# Patient Record
Sex: MALE | Race: WHITE | NOT HISPANIC OR LATINO | Employment: OTHER | URBAN - METROPOLITAN AREA
[De-identification: names, ages, dates, MRNs, and addresses within clinical notes are randomized per-mention and may not be internally consistent; named-entity substitution may affect disease eponyms.]

---

## 2023-04-03 ENCOUNTER — OFFICE VISIT (OUTPATIENT)
Dept: ORTHOPEDIC SURGERY | Facility: CLINIC | Age: 63
End: 2023-04-03
Payer: COMMERCIAL

## 2023-04-03 VITALS — HEART RATE: 86 BPM | BODY MASS INDEX: 37.24 KG/M2 | HEIGHT: 71 IN | OXYGEN SATURATION: 94 % | WEIGHT: 266 LBS

## 2023-04-03 DIAGNOSIS — S76.312A PARTIAL HAMSTRING TEAR, LEFT, INITIAL ENCOUNTER: Primary | ICD-10-CM

## 2023-04-03 DIAGNOSIS — S86.812A STRAIN OF CALF MUSCLE, LEFT, INITIAL ENCOUNTER: ICD-10-CM

## 2023-04-03 PROCEDURE — 99203 OFFICE O/P NEW LOW 30 MIN: CPT | Performed by: STUDENT IN AN ORGANIZED HEALTH CARE EDUCATION/TRAINING PROGRAM
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
## 2023-04-17 ENCOUNTER — OFFICE VISIT (OUTPATIENT)
Dept: ORTHOPEDIC SURGERY | Facility: CLINIC | Age: 63
End: 2023-04-17
Payer: COMMERCIAL

## 2023-04-17 VITALS — OXYGEN SATURATION: 93 % | WEIGHT: 266 LBS | HEART RATE: 82 BPM | BODY MASS INDEX: 37.24 KG/M2 | HEIGHT: 71 IN

## 2023-04-17 DIAGNOSIS — S76.312A PARTIAL HAMSTRING TEAR, LEFT, INITIAL ENCOUNTER: Primary | ICD-10-CM

## 2023-04-17 DIAGNOSIS — S86.812A STRAIN OF CALF MUSCLE, LEFT, INITIAL ENCOUNTER: ICD-10-CM

## 2023-04-17 NOTE — PROGRESS NOTES
"Chief Complaint  Pain and Follow-up of the Left Leg    Subjective          Bandar Stanford presents to [facility] for   History of Present Illness    The patient presents here today for follow up evaluation of the left leg. The patient has been treating his left partial hamstring tear conservatively. He reports he is overall doing well. He still has tenderness and pain. He has been using an ice machine with some relief.     Allergies   Allergen Reactions   • Sulfa Antibiotics Other (See Comments)     Pt reports that he gets fever whenever he takes Sulfa        Social History     Socioeconomic History   • Marital status:    Tobacco Use   • Smoking status: Every Day     Packs/day: 1.00     Years: 15.00     Pack years: 15.00     Types: Cigarettes   Vaping Use   • Vaping Use: Never used   Substance and Sexual Activity   • Alcohol use: Yes     Alcohol/week: 26.0 standard drinks     Types: 21 Shots of liquor, 5 Drinks containing 0.5 oz of alcohol per week   • Drug use: Not Currently   • Sexual activity: Yes     Partners: Female     Birth control/protection: Same-sex partner        I reviewed the patient's chief complaint, history of present illness, review of systems, past medical history, surgical history, family history, social history, medications, and allergy list.     REVIEW OF SYSTEMS    Constitutional: Denies fevers, chills, weight loss  Cardiovascular: Denies chest pain, shortness of breath  Skin: Denies rashes, acute skin changes  Neurologic: Denies headache, loss of consciousness  MSK: left lower extremity pain      Objective   Vital Signs:   Pulse 82   Ht 180.3 cm (71\")   Wt 121 kg (266 lb)   SpO2 93%   BMI 37.10 kg/m²     Body mass index is 37.1 kg/m².    Physical Exam    General: Alert. No acute distress.   Left lower extremity- Swelling and bruising improving but settling to the lower leg. Tender to the calf along the medial head of the gastroph. Negative bunn. Knee " Stable to varus/valgus stress. Negative Natty's. Tender to the posterior hamstring.     Procedures    Imaging Results (Most Recent)     None                   Assessment and Plan        No results found.     Diagnoses and all orders for this visit:    1. Partial hamstring tear, left, initial encounter (Primary)    2. Strain of calf muscle, left, initial encounter        Discussed the treatment plan with the patient.  Plan to continue conservative treatment. We discussed using compression socks for the swelling. Plan to continue home exercises. Plan to continue NSAIDS for swelling.     Educated on risk of smoking. Discussed options for smoking cessation. and Call or return if worsening symptoms.    Scribed for Rigo Davis MD by Rosie Guzmán  04/17/2023   10:12 EDT         Follow Up       4 weeks    Patient was given instructions and counseling regarding his condition or for health maintenance advice. Please see specific information pulled into the AVS if appropriate.       I have personally performed the services described in this document as scribed by the above individual and it is both accurate and complete.     Rigo Davis MD  04/17/23  10:16 EDT

## 2023-06-07 ENCOUNTER — OFFICE VISIT (OUTPATIENT)
Dept: ORTHOPEDIC SURGERY | Facility: CLINIC | Age: 63
End: 2023-06-07
Payer: COMMERCIAL

## 2023-06-07 VITALS
SYSTOLIC BLOOD PRESSURE: 157 MMHG | WEIGHT: 250 LBS | BODY MASS INDEX: 35 KG/M2 | DIASTOLIC BLOOD PRESSURE: 100 MMHG | OXYGEN SATURATION: 96 % | HEIGHT: 71 IN | HEART RATE: 72 BPM

## 2023-06-07 DIAGNOSIS — S76.312D PARTIAL HAMSTRING TEAR, LEFT, SUBSEQUENT ENCOUNTER: Primary | ICD-10-CM

## 2023-06-07 DIAGNOSIS — S86.812D STRAIN OF CALF MUSCLE, LEFT, SUBSEQUENT ENCOUNTER: ICD-10-CM

## 2023-06-07 RX ORDER — TADALAFIL 20 MG/1
TABLET ORAL EVERY 24 HOURS
COMMUNITY

## 2023-06-07 NOTE — PROGRESS NOTES
"Chief Complaint  Pain and Initial Evaluation of the Left Thigh    Subjective          Bandar Stanford presents to [facility] for   History of Present Illness    The patient presents here today for evaluation of the left thigh. The patient has been treating his left partial hamstring tear conservatively. He has been doing home exercises and medication as needed. He reports it is improving.     Allergies   Allergen Reactions    Sulfa Antibiotics Other (See Comments)     Pt reports that he gets fever whenever he takes Sulfa        Social History     Socioeconomic History    Marital status:    Tobacco Use    Smoking status: Every Day     Packs/day: 1.00     Years: 15.00     Pack years: 15.00     Types: Cigarettes    Smokeless tobacco: Never   Vaping Use    Vaping Use: Never used   Substance and Sexual Activity    Alcohol use: Yes     Alcohol/week: 26.0 standard drinks     Types: 21 Shots of liquor, 5 Drinks containing 0.5 oz of alcohol per week    Drug use: Not Currently    Sexual activity: Yes     Partners: Female     Birth control/protection: Same-sex partner        I reviewed the patient's chief complaint, history of present illness, review of systems, past medical history, surgical history, family history, social history, medications, and allergy list.     REVIEW OF SYSTEMS    Constitutional: Denies fevers, chills, weight loss  Cardiovascular: Denies chest pain, shortness of breath  Skin: Denies rashes, acute skin changes  Neurologic: Denies headache, loss of consciousness  MSK: Left lower extremity pain      Objective   Vital Signs:   /100   Pulse 72   Ht 180.3 cm (71\")   Wt 113 kg (250 lb)   SpO2 96%   BMI 34.87 kg/m²     Body mass index is 34.87 kg/m².    Physical Exam    General: Alert. No acute distress.   Left lower extremity- Tender to the mid hamstring with no gaps. Full knee extension with 120 flexion. Neurovascularly intact. 5/5 knee flexion and extension. " Stable to varus/valgus stress. Stable to anterior/posterior drawer. Knee Extensor Mechanism  intact. Positive EHL, FHL, GS and TA. Sensation intact to all 5 nerves of the foot. Positive pulses.     Procedures    Imaging Results (Most Recent)       None                     Assessment and Plan        No results found.     Diagnoses and all orders for this visit:    1. Partial hamstring tear, left, subsequent encounter (Primary)    2. Strain of calf muscle, left, subsequent encounter        Discussed the treatment plan with the patient.  Plan to continue conservative treatment at this time. Plan to continue home exercises, medication and topicals as needed. Plan to gradually increase activity as tolerated.         Call or return if worsening symptoms.    Scribed for Rigo Davis MD by Rosie Guzmán  06/07/2023   10:45 EDT         Follow Up       PRN    Patient was given instructions and counseling regarding his condition or for health maintenance advice. Please see specific information pulled into the AVS if appropriate.       I have personally performed the services described in this document as scribed by the above individual and it is both accurate and complete.     Rigo Davis MD  06/07/23  10:47 EDT

## 2023-10-04 ENCOUNTER — OFFICE VISIT (OUTPATIENT)
Dept: ORTHOPEDIC SURGERY | Facility: CLINIC | Age: 63
End: 2023-10-04
Payer: COMMERCIAL

## 2023-10-04 VITALS
HEIGHT: 71 IN | HEART RATE: 77 BPM | BODY MASS INDEX: 35 KG/M2 | OXYGEN SATURATION: 94 % | SYSTOLIC BLOOD PRESSURE: 160 MMHG | DIASTOLIC BLOOD PRESSURE: 107 MMHG | WEIGHT: 250 LBS

## 2023-10-04 DIAGNOSIS — M16.12 PRIMARY OSTEOARTHRITIS OF LEFT HIP: ICD-10-CM

## 2023-10-04 DIAGNOSIS — M25.551 RIGHT HIP PAIN: Primary | ICD-10-CM

## 2023-10-04 DIAGNOSIS — S76.312A PARTIAL HAMSTRING TEAR, LEFT, INITIAL ENCOUNTER: ICD-10-CM

## 2023-10-04 RX ORDER — DICLOFENAC SODIUM 75 MG/1
75 TABLET, DELAYED RELEASE ORAL 2 TIMES DAILY
Qty: 30 TABLET | Refills: 0 | Status: SHIPPED | OUTPATIENT
Start: 2023-10-04

## 2023-10-04 RX ORDER — METHYLPREDNISOLONE 4 MG/1
1 TABLET ORAL DAILY
Qty: 21 TABLET | Refills: 0 | Status: SHIPPED | OUTPATIENT
Start: 2023-10-04
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
## 2023-11-15 ENCOUNTER — OFFICE VISIT (OUTPATIENT)
Dept: ORTHOPEDIC SURGERY | Facility: CLINIC | Age: 63
End: 2023-11-15
Payer: COMMERCIAL

## 2023-11-15 VITALS
HEIGHT: 71 IN | OXYGEN SATURATION: 95 % | WEIGHT: 250 LBS | SYSTOLIC BLOOD PRESSURE: 154 MMHG | BODY MASS INDEX: 35 KG/M2 | HEART RATE: 83 BPM | DIASTOLIC BLOOD PRESSURE: 108 MMHG

## 2023-11-15 DIAGNOSIS — M16.12 PRIMARY OSTEOARTHRITIS OF LEFT HIP: Primary | ICD-10-CM

## 2023-11-15 DIAGNOSIS — S76.312D PARTIAL HAMSTRING TEAR, LEFT, SUBSEQUENT ENCOUNTER: ICD-10-CM

## 2023-11-15 NOTE — PROGRESS NOTES
"Chief Complaint  Follow-up and Pain of the Right Hip    Subjective          Bandar Stanford presents to [facility] for   History of Present Illness    The patient presents here today for follow up evaluation of the right hip. The patient has been treating his left partial hamstring tear and hip osteoarthritis conservatively. He still locates pain to the groin radiating to the thigh and knee. He has been taking diclofenac. He tried massage therapy with some relief.     Allergies   Allergen Reactions    Sulfa Antibiotics Other (See Comments)     Pt reports that he gets fever whenever he takes Sulfa        Social History     Socioeconomic History    Marital status:    Tobacco Use    Smoking status: Every Day     Packs/day: 1.00     Years: 15.00     Additional pack years: 0.00     Total pack years: 15.00     Types: Cigarettes    Smokeless tobacco: Never   Vaping Use    Vaping Use: Never used   Substance and Sexual Activity    Alcohol use: Yes     Alcohol/week: 26.0 standard drinks of alcohol     Types: 21 Shots of liquor, 5 Drinks containing 0.5 oz of alcohol per week    Drug use: Not Currently    Sexual activity: Yes     Partners: Female     Birth control/protection: Same-sex partner        I reviewed the patient's chief complaint, history of present illness, review of systems, past medical history, surgical history, family history, social history, medications, and allergy list.     REVIEW OF SYSTEMS    Constitutional: Denies fevers, chills, weight loss  Cardiovascular: Denies chest pain, shortness of breath  Skin: Denies rashes, acute skin changes  Neurologic: Denies headache, loss of consciousness  MSK: Right hip pain      Objective   Vital Signs:   BP (!) 154/108 Comment: PATIENT ADVISED TO SEE PCP REGARDING HIGH BP  Pulse 83   Ht 180.3 cm (71\")   Wt 113 kg (250 lb)   SpO2 95%   BMI 34.87 kg/m²     Body mass index is 34.87 kg/m².    Physical Exam    General: Alert. No acute " distress.   Right lower extremity- Positive EHL, FHL, GS and TA. Sensation intact to all 5 nerves of the foot. Positive pulses. Flexion 90 with groin pain. Internal rotation neutral with pain. External Rotation 20 with pain. Full knee extension. Flexion 120 degrees. Stable to varus/valgus stress. Stable to anterior/posterior drawer. Negative Lachman's. Calf soft. Intact hip flexion and hip Abduction. Negative straight leg raise      Procedures    Imaging Results (Most Recent)       None                     Assessment and Plan        No results found.     Diagnoses and all orders for this visit:    1. Primary osteoarthritis of left hip (Primary)    2. Partial hamstring tear, left, subsequent encounter        Discussed the treatment plan with the patient.  Plan to continue conservative treatment at this time. Plan to continue diclofenac, home exercises and activity modification. May consider injection if symptoms persist.       Educated on risk of smoking/nicotine. Discussed options for smoking cessation. and Call or return if worsening symptoms.    Scribed for Rigo Davis MD by Rosie Guzmán  11/15/2023   10:53 EST         Follow Up       PRN    Patient was given instructions and counseling regarding his condition or for health maintenance advice. Please see specific information pulled into the AVS if appropriate.       I have personally performed the services described in this document as scribed by the above individual and it is both accurate and complete.     Rigo Davis MD  11/15/23  10:56 EST

## 2024-04-01 ENCOUNTER — OFFICE VISIT (OUTPATIENT)
Dept: ORTHOPEDIC SURGERY | Facility: CLINIC | Age: 64
End: 2024-04-01
Payer: COMMERCIAL

## 2024-04-01 VITALS
HEART RATE: 73 BPM | SYSTOLIC BLOOD PRESSURE: 163 MMHG | DIASTOLIC BLOOD PRESSURE: 116 MMHG | HEIGHT: 71 IN | BODY MASS INDEX: 35 KG/M2 | WEIGHT: 250 LBS | OXYGEN SATURATION: 95 %

## 2024-04-01 DIAGNOSIS — M25.551 RIGHT HIP PAIN: Primary | ICD-10-CM

## 2024-04-01 DIAGNOSIS — M16.12 PRIMARY OSTEOARTHRITIS OF LEFT HIP: ICD-10-CM

## 2024-04-01 DIAGNOSIS — S76.312D PARTIAL HAMSTRING TEAR, LEFT, SUBSEQUENT ENCOUNTER: ICD-10-CM

## 2024-04-01 PROCEDURE — 99213 OFFICE O/P EST LOW 20 MIN: CPT | Performed by: STUDENT IN AN ORGANIZED HEALTH CARE EDUCATION/TRAINING PROGRAM

## 2024-04-01 RX ORDER — ASPIRIN 81 MG/1
81 TABLET ORAL DAILY
COMMUNITY

## 2024-04-01 NOTE — PROGRESS NOTES
"Chief Complaint  Follow-up and Pain of the Right Hip    Subjective          Bandar Stanford presents to [facility] for   History of Present Illness    The patient presents here today for follow up evaluation of the right hip. The patient has been treating his left partial hamstring tear and hip osteoarthritis conservatively. He reports he still has pain with ROM of the hip. He has been taking diclofenac. He has no other complaints.     Allergies   Allergen Reactions    Sulfa Antibiotics Other (See Comments)     Pt reports that he gets fever whenever he takes Sulfa        Social History     Socioeconomic History    Marital status:    Tobacco Use    Smoking status: Every Day     Current packs/day: 1.00     Average packs/day: 1 pack/day for 15.0 years (15.0 ttl pk-yrs)     Types: Cigarettes    Smokeless tobacco: Never   Vaping Use    Vaping status: Never Used   Substance and Sexual Activity    Alcohol use: Yes     Alcohol/week: 26.0 standard drinks of alcohol     Types: 21 Shots of liquor, 5 Drinks containing 0.5 oz of alcohol per week    Drug use: Not Currently    Sexual activity: Yes     Partners: Female     Birth control/protection: Same-sex partner        I reviewed the patient's chief complaint, history of present illness, review of systems, past medical history, surgical history, family history, social history, medications, and allergy list.     REVIEW OF SYSTEMS    Constitutional: Denies fevers, chills, weight loss  Cardiovascular: Denies chest pain, shortness of breath  Skin: Denies rashes, acute skin changes  Neurologic: Denies headache, loss of consciousness  MSK: Right hip pain      Objective   Vital Signs:   BP (!) 163/116 Comment: advised to see pcp  Pulse 73   Ht 180.3 cm (71\")   Wt 113 kg (250 lb)   SpO2 95%   BMI 34.87 kg/m²     Body mass index is 34.87 kg/m².    Physical Exam    General: Alert. No acute distress.   Right lower extremity- Positive EHL, FHL, GS " and TA. Sensation intact to all 5 nerves of the foot. Positive pulses. Flexion 90 with groin pain. Internal rotation neutral with pain. External Rotation 20 with pain. Internal rotation 5. Full knee extension. Flexion 120 degrees. Stable to varus/valgus stress. Stable to anterior/posterior drawer. Negative Lachman's. Calf soft. Intact hip flexion and hip Abduction. Negative straight leg raise      Procedures    Imaging Results (Most Recent)       Procedure Component Value Units Date/Time    XR Hip With or Without Pelvis 2 - 3 View Right [[id number]] Resulted: 04/01/24 1132     Updated: 04/01/24 1133    Narrative:      Indications: Right hip pain    Views: AP pelvis, AP and frog lateral right hip    Findings: Pubic symphysis plate and SI screw in place without evidence of   hardware complication.  Advanced degenerative changes in the right hip   with bone-on-bone loss of articular height superiorly.  No fractures   noted.  Hip reduced.    Comparative Data: Comparative data found and reviewed today                     Assessment and Plan        XR Hip With or Without Pelvis 2 - 3 View Right    Result Date: 4/1/2024  Narrative: Indications: Right hip pain Views: AP pelvis, AP and frog lateral right hip Findings: Pubic symphysis plate and SI screw in place without evidence of hardware complication.  Advanced degenerative changes in the right hip with bone-on-bone loss of articular height superiorly.  No fractures noted.  Hip reduced. Comparative Data: Comparative data found and reviewed today      Diagnoses and all orders for this visit:    1. Right hip pain (Primary)  -     XR Hip With or Without Pelvis 2 - 3 View Right    2. Primary osteoarthritis of left hip    3. Partial hamstring tear, left, subsequent encounter        Discussed the treatment plan with the patient.  I reviewed the x-rays that were obtained today with the patient. Plan to continue conservative treatment at this time. Home exercises given today.  Activity modification reviewed. Plan for OTC medication as needed. May consider injection if symptoms persist.         Call or return if worsening symptoms.    Scribed for Rigo Davis MD by Rosie Guzmán  04/01/2024   10:46 EDT         Follow Up       PRN    Patient was given instructions and counseling regarding his condition or for health maintenance advice. Please see specific information pulled into the AVS if appropriate.       I have personally performed the services described in this document as scribed by the above individual and it is both accurate and complete.     Rigo Davis MD  04/01/24  12:51 EDT